# Patient Record
Sex: FEMALE | Race: WHITE
[De-identification: names, ages, dates, MRNs, and addresses within clinical notes are randomized per-mention and may not be internally consistent; named-entity substitution may affect disease eponyms.]

---

## 2018-02-05 ENCOUNTER — HOSPITAL ENCOUNTER (OUTPATIENT)
Dept: HOSPITAL 14 - H.ER | Age: 83
Setting detail: OBSERVATION
LOS: 1 days | Discharge: HOME | End: 2018-02-06
Attending: FAMILY MEDICINE | Admitting: FAMILY MEDICINE
Payer: MEDICARE

## 2018-02-05 VITALS — RESPIRATION RATE: 18 BRPM

## 2018-02-05 DIAGNOSIS — G51.0: ICD-10-CM

## 2018-02-05 DIAGNOSIS — R42: Primary | ICD-10-CM

## 2018-02-05 DIAGNOSIS — Z91.018: ICD-10-CM

## 2018-02-05 LAB
ALBUMIN SERPL-MCNC: 3.5 G/DL (ref 3.5–5)
ALBUMIN/GLOB SERPL: 1.3 {RATIO} (ref 1–2.1)
ALT SERPL-CCNC: 30 U/L (ref 9–52)
AST SERPL-CCNC: 22 U/L (ref 14–36)
BASOPHILS # BLD AUTO: 0.1 K/UL (ref 0–0.2)
BASOPHILS NFR BLD: 1 % (ref 0–2)
BUN SERPL-MCNC: 20 MG/DL (ref 7–17)
CALCIUM SERPL-MCNC: 9.1 MG/DL (ref 8.4–10.2)
EOSINOPHIL # BLD AUTO: 0.2 K/UL (ref 0–0.7)
EOSINOPHIL NFR BLD: 4.2 % (ref 0–4)
ERYTHROCYTE [DISTWIDTH] IN BLOOD BY AUTOMATED COUNT: 13.5 % (ref 11.5–14.5)
GFR NON-AFRICAN AMERICAN: 59
HGB BLD-MCNC: 11.5 G/DL (ref 12–16)
LYMPHOCYTES # BLD AUTO: 0.9 K/UL (ref 1–4.3)
LYMPHOCYTES NFR BLD AUTO: 18 % (ref 20–40)
MAGNESIUM SERPL-MCNC: 2 MG/DL (ref 1.6–2.3)
MCH RBC QN AUTO: 32 PG (ref 27–31)
MCHC RBC AUTO-ENTMCNC: 33.7 G/DL (ref 33–37)
MCV RBC AUTO: 95.1 FL (ref 81–99)
MONOCYTES # BLD: 0.3 K/UL (ref 0–0.8)
MONOCYTES NFR BLD: 6.8 % (ref 0–10)
NEUTROPHILS # BLD: 3.6 K/UL (ref 1.8–7)
NEUTROPHILS NFR BLD AUTO: 70 % (ref 50–75)
NRBC BLD AUTO-RTO: 0 % (ref 0–0)
PLATELET # BLD: 177 K/UL (ref 130–400)
PMV BLD AUTO: 7.6 FL (ref 7.2–11.7)
RBC # BLD AUTO: 3.58 MIL/UL (ref 3.8–5.2)
WBC # BLD AUTO: 5.2 K/UL (ref 4.8–10.8)

## 2018-02-05 PROCEDURE — 71045 X-RAY EXAM CHEST 1 VIEW: CPT

## 2018-02-05 PROCEDURE — 84484 ASSAY OF TROPONIN QUANT: CPT

## 2018-02-05 PROCEDURE — 82948 REAGENT STRIP/BLOOD GLUCOSE: CPT

## 2018-02-05 PROCEDURE — 83735 ASSAY OF MAGNESIUM: CPT

## 2018-02-05 PROCEDURE — 93005 ELECTROCARDIOGRAM TRACING: CPT

## 2018-02-05 PROCEDURE — 70450 CT HEAD/BRAIN W/O DYE: CPT

## 2018-02-05 PROCEDURE — 80053 COMPREHEN METABOLIC PANEL: CPT

## 2018-02-05 PROCEDURE — 99283 EMERGENCY DEPT VISIT LOW MDM: CPT

## 2018-02-05 PROCEDURE — 85025 COMPLETE CBC W/AUTO DIFF WBC: CPT

## 2018-02-05 PROCEDURE — 87040 BLOOD CULTURE FOR BACTERIA: CPT

## 2018-02-05 PROCEDURE — 87086 URINE CULTURE/COLONY COUNT: CPT

## 2018-02-05 NOTE — RAD
HISTORY:

near syncope  



COMPARISON:

07/08/2009. 



FINDINGS:



LUNGS:

No active pulmonary disease.



PLEURA:

No significant pleural effusion identified, no pneumothorax apparent.



CARDIOVASCULAR:

 No radiographic findings to suggest acute or significant 

cardiovascular disease.



OSSEOUS STRUCTURES:

No significant abnormalities.



VISUALIZED UPPER ABDOMEN:

Normal.



OTHER FINDINGS:

None.



IMPRESSION:

No active disease. No significant interval change compared to the 

prior examination(s).

## 2018-02-05 NOTE — CT
PROCEDURE:  CT scan brain dated 2/5/18. 



HISTORY:

Near syncope. 



COMPARISON:

No prior study available for comparison 



TECHNIQUE:

Axial computed tomography images were obtained through the head/brain 

without intravenous contrast.  



Radiation dose:



Total exam DLP = 757.76 mGy-cm.



This CT exam was performed using one or more of the following dose 

reduction techniques: Automated exposure control, adjustment of the 

mA and/or kV according to patient size, and/or use of iterative 

reconstruction technique.



FINDINGS:



HEMORRHAGE:

No acute parenchymal, subarachnoid or extra-axial. 



BRAIN:

Mild to moderate diffuse/confluent chronic white matter ischemic 

changes seen extending peripherally into the deep and subcortical 

white matter both cerebral hemispheres.  Changes are most conspicuous 

in the posterior parieto-occipital watershed zone. Additionally, 

there may also be a few scattered chronic appearing bilateral basal 

nuclei lacunar type infarcts. 



Calcifications seen dentate nuclei both cerebellar hemispheres; rule 

out the metabolic abnormality. 



Moderate generalized volume loss. 



Minor vascular calcifications both carotid siphons. 



VENTRICLES:

No obstructive hydrocephalus. 



CALVARIUM:

Unremarkable.



PARANASAL SINUSES:

Mild to moderate mucosal thickening seen within the ethmoid air 

complex.  Minor mucosal thickening sphenoid sinus.



MASTOID AIR CELLS:

Unremarkable as visualized. No inflammatory changes.



OTHER FINDINGS:

None.



IMPRESSION:

No acute intracranial hemorrhage.



Mild to moderate chronic white matter ischemic changes with suspected 

few chronic appearing bilateral basal nuclei lacunar type infarcts. 



Calcifications both dentate nuclei ; rule out metabolic abnormality. 



Moderate generalized volume loss.



Mucoperiosteal inflammatory changes at aforementioned paranasal 

sinuses.

## 2018-02-05 NOTE — ED PDOC
Syncope/Near Syncope/Dizziness


Additional Complaint(s): 


88 yo female pmhx childhood Bell palsy presents to ED  c/o feeling 

lightheadedness and "woozy" this morning when getting up from sitting position. 

Pt reports she has hx dizziness for almost year now but this morning she felt 

dizzy more than usual. Denies any chest pain, palpitation, dyspnea, blurry 

vision, nausea, vomiting or headche before the dizziness or after dizziness. 

Denies fever, chills or  complaints. 








PMD: Dr. Farrar





<Sultan Maximilian - Last Filed: 18 16:37>





<Reyes Fournier - Last Filed: 18 16:49>


Time Seen by Provider: 18 13:40


Chief Complaint (Nursing): Dizziness/Lightheaded





Past Medical History


Vital Signs: 





 Last Vital Signs











Temp  97.3 F L  18 13:06


 


Pulse  71   18 13:06


 


Resp  16   18 13:06


 


BP  108/54 L  18 13:06


 


Pulse Ox  98   18 13:06














- Medical History


PMH: Bronchitis (Chronic ), Diverticulitis


Other PMH: Childhood Bell palsy





- Surgical History


Other surgeries: Partial colectomy





- Family History


Family History: States: No Known Family Hx





- Social History


Current smoker - smoking cessation education provided: No


Alcohol: None


Drugs: Sherlyn





<Sultan Maximilian - Last Filed: 18 16:37>


Vital Signs: 





 Last Vital Signs











Temp  97.3 F L  18 13:06


 


Pulse  71   18 13:06


 


Resp  16   18 13:06


 


BP  108/54 L  18 13:06


 


Pulse Ox  98   18 14:39














<Reyes Fournier - Last Filed: 18 16:49>





- Allergies


Allergies/Adverse Reactions: 


 Allergies











Allergy/AdvReac Type Severity Reaction Status Date / Time


 


barley Allergy  SWELLING Verified 18 13:12


 


gluten Allergy  SWELLING Verified 18 13:12


 


wheat Allergy  SWELLING Verified 18 13:12














Review of Systems


Constitutional: Negative for: Fever, Chills


Eyes: Negative for: Vision Change


Cardiovascular: Negative for: Chest Pain, Palpitations


Respiratory: Negative for: Cough, Shortness of Breath


Gastrointestinal: Negative for: Nausea, Vomiting, Abdominal Pain


Genitourinary Female: Negative for: Dysuria


Neurological: Negative for: Confusion, Headache





<Sultan Maximilian - Last Filed: 18 16:37>





Physical Exam





- Physical Exam


Appears: Negative for: Non-toxic, No Acute Distress


Head Exam: Positive for: ATRAUMATIC (Facial asymetry, drooping on left sided. 

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


ENT: Positive for: Other (Cerumen on right ear)


Neck: Positive for: Normal


Cardiovascular/Chest: Positive for: Regular Rate, Rhythm.  Negative for: Murmur


Respiratory: Positive for: Normal Breath Sounds.  Negative for: Crackles, 

Wheezing


Gastrointestinal/Abdominal: Positive for: Bowel Sounds, Soft.  Negative for: 

Tenderness


Extremity: Positive for: Normal ROM


Neurologic/Psych: Positive for: Alert, Oriented





<Sultan Maximilian - Last Filed: 18 16:37>





- Laboratory Results


Result Diagrams: 


 18 15:08





 18 15:08





- ECG


O2 Sat by Pulse Oximetry: 98





- Radiology


X-Ray: Read By Radiologist


X-Ray Interpretation: No Acute Disease





- Progress


ED Course And Treament: 


Assessment:


 88 yo female pmhx Bell palsy presents to ED with c/o feeling dizzy and 

lightheadedness.





Plan: 


   EKG


   HEAD CT W/O CONTRAST


   CMP


   CBC


   MG


   TROPONIN I


   BLOOD CX


   URINE CX


   FINGER STICK GLUCOSE


   ORTHOSTATIC  BP


   IV FLUID 500 CC NS BOLUS





Case d/w ED attending Dr. Fournier





Re-evaluation: 4:30 pm





Labs reviewed, CBC only remarkable for Hb 11.5


CT scan shows no acute intracranial hemorrhage


CXR is negative for acute cardiopulmonary disease.


Pt will be admitted for telemetry for continuous monitoring for 24 hours under 

Dr. Farrar's service.








Case d/w ED attending Dr. Fournier








<Sultan Maximilian - Last Filed: 18 16:37>





- Laboratory Results


Result Diagrams: 


 18 15:08





 18 15:08





<Reyes Fournier - Last Filed: 18 16:49>





Medical Decision Making


Medical Decision Makin yo female with feeling of wuzziness described as lightheadedness. No pain. 

Agree with resident history and physical, assessment and plan.


DDx: Near Syncope r/o Neuro vs  Cardiac vs BPV vs Infection





********************************************************************************

***


PROCEDURE:  CT scan brain dated 18. 


IMPRESSION:


No acute intracranial hemorrhage.


Mild to moderate chronic white matter ischemic changes with suspected few 

chronic appearing bilateral basal nuclei lacunar type infarcts. 


Calcifications both dentate nuclei ; rule out metabolic abnormality. 


Moderate generalized volume loss.


Mucoperiosteal inflammatory changes at aforementioned paranasal sinus


********************************************************************************

***





Labs reviewed. CT reviewed. Discussed case with Dr. Farrar who will accept her 

to telemetry for near syncope, dizziness. 





EKG: NSR at 69 bpm with no ST elevations, TWI III





<Reyes Fournier - Last Filed: 18 16:49>





Disposition





<Sultan Maximilian - Last Filed: 18 16:37>





- Patient ED Disposition


Is Patient to be Admitted: Yes





- Disposition


Disposition Time: 16:49





- Pt Status Changed To:


Hospital Disposition Of: Observation





<Reyes Fournier - Last Filed: 18 16:49>





- Clinical Impression


Clinical Impression: 


 Near syncope








- Disposition


Condition: FAIR


Forms:  [employer] (English)

## 2018-02-06 VITALS
HEART RATE: 80 BPM | OXYGEN SATURATION: 94 % | SYSTOLIC BLOOD PRESSURE: 100 MMHG | TEMPERATURE: 97.8 F | DIASTOLIC BLOOD PRESSURE: 63 MMHG

## 2018-02-06 NOTE — CP.PCM.HP
History of Present Illness





- History of Present Illness


History of Present Illness: 





86 YO F w/ PMH of bell palsy was admitted for dizziness. Dizziness has been 

going on for some time but recently has gotten worse. Dizziness is aggrevated 

when getting up all of a sudden. States it sometimes occurs when she has not 

ate properly and is associated with some sweating, but denies palpatations. 

Denies any LOC or head trauma. 


Currently since she has been at the hospital she denies such symptoms. 


Currently denies chest pain, palpatations, SOB, dyspnea, nausea, vomiting 

diarrhea. 





PMH: Diverticulitis, Cilliac disease, Chronic bronchitis


Allergy: Barley, gluten , wheat


PMD: Dr. Farrar














Present on Admission





- Present on Admission


Any Indicators Present on Admission: No





Review of Systems





- Review of Systems


All systems: reviewed and no additional remarkable complaints except





Past Patient History





- Infectious Disease


Hx of Infectious Diseases: None





- Past Medical History & Family History


Past Medical History?: Yes





- Past Social History


Smoking Status: Never Smoked





- CARDIAC


Hx Cardiac Disorders: No





- PULMONARY


Hx Respiratory Disorders: Yes


Hx Bronchitis: Yes (Chronic )





- NEUROLOGICAL


Hx Neurological Disorder: No





- HEENT


Hx HEENT Problems: Yes


Hx Cataracts: Yes





- RENAL


Hx Chronic Kidney Disease: No





- ENDOCRINE/METABOLIC


Hx Endocrine Disorders: No





- HEMATOLOGICAL/ONCOLOGICAL


Hx Blood Disorders: No





- INTEGUMENTARY


Hx Dermatological Problems: No





- MUSCULOSKELETAL/RHEUMATOLOGICAL


Hx Musculoskeletal Disorders: No


Hx Falls: No





- GASTROINTESTINAL


Hx Gastrointestinal Disorders: Yes


Hx Diverticulitis: Yes


Other/Comment: Celiac disease





- GENITOURINARY/GYNECOLOGICAL


Hx Genitourinary Disorders: Yes


Hx Incontinence: Yes





- PSYCHIATRIC


Hx Psychophysiologic Disorder: No


Hx Substance Use: No





- SURGICAL HISTORY


Hx Surgeries: Yes


Other/Comment: left eyelid surgery, partial colectomy





- ANESTHESIA


Hx Anesthesia: Yes


Hx Anesthesia Reactions: No





Meds


Home Medications: 


 Home Medication List











 Medication  Instructions  Recorded  Confirmed  Type


 


Meclizine [Meclizine*] 25 mg PO Q6 #30 tab 02/06/18  Rx











Allergies/Adverse Reactions: 


 Allergies











Allergy/AdvReac Type Severity Reaction Status Date / Time


 


barley Allergy  SWELLING Verified 02/05/18 13:12


 


gluten Allergy  SWELLING Verified 02/05/18 13:12


 


wheat Allergy  SWELLING Verified 02/05/18 13:12














Physical Exam





- Constitutional


Appears: No Acute Distress





- Head Exam


Head Exam: NORMAL INSPECTION


Additional comments: 


facial asymetry, drooping of left side. Unable to close left eye secondary to 

chronic bells palsy





- Eye Exam


Eye Exam: Normal appearance





- Respiratory Exam


Respiratory Exam: Clear to Auscultation Bilateral, NORMAL BREATHING PATTERN.  

absent: Rhonchi, Wheezes





- Cardiovascular Exam


Cardiovascular Exam: REGULAR RHYTHM, +S1, +S2





- GI/Abdominal Exam


GI & Abdominal Exam: Normal Bowel Sounds, Soft.  absent: Tenderness





- Neurological Exam


Neurological exam: Alert, Oriented x3





- Skin


Skin Exam: Normal Color, Warm





Results





- Vital Signs


Recent Vital Signs: 





 Last Vital Signs











Temp  98.9 F   02/06/18 08:50


 


Pulse  75   02/06/18 08:50


 


Resp  18   02/06/18 08:50


 


BP  159/74 H  02/06/18 08:50


 


Pulse Ox  96   02/06/18 08:50














- Labs


Result Diagrams: 


 02/05/18 15:08





 02/05/18 15:08


Labs: 





 Laboratory Results - last 24 hr











  02/05/18 02/05/18 02/05/18





  13:19 15:08 15:08


 


WBC   5.2 


 


RBC   3.58 L 


 


Hgb   11.5 L 


 


Hct   34.0 


 


MCV   95.1 


 


MCH   32.0 H 


 


MCHC   33.7 


 


RDW   13.5 


 


Plt Count   177 


 


MPV   7.6 


 


Neut % (Auto)   70.0 


 


Lymph % (Auto)   18.0 L 


 


Mono % (Auto)   6.8 


 


Eos % (Auto)   4.2 H 


 


Baso % (Auto)   1.0 


 


Neut # (Auto)   3.6 


 


Lymph # (Auto)   0.9 L 


 


Mono # (Auto)   0.3 


 


Eos # (Auto)   0.2 


 


Baso # (Auto)   0.1 


 


Sodium    141


 


Potassium    4.6


 


Chloride    109 H


 


Carbon Dioxide    24


 


Anion Gap    13


 


BUN    20 H


 


Creatinine    0.9


 


Est GFR ( Amer)    > 60


 


Est GFR (Non-Af Amer)    59


 


POC Glucose (mg/dL)  85  


 


Random Glucose    88


 


Calcium    9.1


 


Magnesium    2.0


 


Total Bilirubin    0.4


 


AST    22


 


ALT    30


 


Alkaline Phosphatase    52


 


Troponin I    < 0.0120


 


Total Protein    6.2 L


 


Albumin    3.5


 


Globulin    2.6


 


Albumin/Globulin Ratio    1.3














Assessment & Plan





- Assessment and Plan (Free Text)


Assessment: 


 88 yo female pmhx Bell palsy was admitted with c/o feeling dizzy and 

lightheadedness.





1) Dizziness/ light headedness (resolved) unknown etiology: 


- Patient is being admitted for observation on telemetry


- IV fluids


- Neurocheck Q4


- Head CT negative


- Troponin x1 negative


- Meclizine PRN

## 2018-02-06 NOTE — CARD
--------------- APPROVED REPORT --------------





EKG Measurement

Heart [id number]

DC 154P3

[id number]

QT420T-15

CBs495



<Conclusion>

Normal sinus rhythm

Nonspecific ST abnormality

Abnormal ECG

## 2018-02-06 NOTE — CP.PCM.DIS
Provider





- Provider


Date of Admission: 


02/05/18 16:10





Attending physician: 


Patricio Farrar MD





Time Spent in preparation of Discharge (in minutes): 30





Diagnosis





- Discharge Diagnosis


(1) Dizziness


Status: Acute   





Hospital Course





- Lab Results


Lab Results: 


 Most Recent Lab Values











WBC  5.2 K/uL (4.8-10.8)   02/05/18  15:08    


 


RBC  3.58 Mil/uL (3.80-5.20)  L  02/05/18  15:08    


 


Hgb  11.5 g/dL (12.0-16.0)  L  02/05/18  15:08    


 


Hct  34.0 % (34.0-47.0)   02/05/18  15:08    


 


MCV  95.1 fl (81.0-99.0)   02/05/18  15:08    


 


MCH  32.0 pg (27.0-31.0)  H  02/05/18  15:08    


 


MCHC  33.7 g/dL (33.0-37.0)   02/05/18  15:08    


 


RDW  13.5 % (11.5-14.5)   02/05/18  15:08    


 


Plt Count  177 K/uL (130-400)   02/05/18  15:08    


 


MPV  7.6 fl (7.2-11.7)   02/05/18  15:08    


 


Neut % (Auto)  70.0 % (50.0-75.0)   02/05/18  15:08    


 


Lymph % (Auto)  18.0 % (20.0-40.0)  L  02/05/18  15:08    


 


Mono % (Auto)  6.8 % (0.0-10.0)   02/05/18  15:08    


 


Eos % (Auto)  4.2 % (0.0-4.0)  H  02/05/18  15:08    


 


Baso % (Auto)  1.0 % (0.0-2.0)   02/05/18  15:08    


 


Neut # (Auto)  3.6 K/uL (1.8-7.0)   02/05/18  15:08    


 


Lymph # (Auto)  0.9 K/uL (1.0-4.3)  L  02/05/18  15:08    


 


Mono # (Auto)  0.3 K/uL (0.0-0.8)   02/05/18  15:08    


 


Eos # (Auto)  0.2 K/uL (0.0-0.7)   02/05/18  15:08    


 


Baso # (Auto)  0.1 K/uL (0.0-0.2)   02/05/18  15:08    


 


Sodium  141 mmol/l (132-148)   02/05/18  15:08    


 


Potassium  4.6 MMOL/L (3.6-5.0)   02/05/18  15:08    


 


Chloride  109 mmol/L ()  H  02/05/18  15:08    


 


Carbon Dioxide  24 mmol/L (22-30)   02/05/18  15:08    


 


Anion Gap  13  (10-20)   02/05/18  15:08    


 


BUN  20 mg/dl (7-17)  H  02/05/18  15:08    


 


Creatinine  0.9 mg/dl (0.7-1.2)   02/05/18  15:08    


 


Est GFR ( Amer)  > 60   02/05/18  15:08    


 


Est GFR (Non-Af Amer)  59   02/05/18  15:08    


 


POC Glucose (mg/dL)  85 mg/dL ()   02/05/18  13:19    


 


Random Glucose  88 mg/dL ()   02/05/18  15:08    


 


Calcium  9.1 mg/dL (8.4-10.2)   02/05/18  15:08    


 


Magnesium  2.0 MG/DL (1.6-2.3)   02/05/18  15:08    


 


Total Bilirubin  0.4 mg/dl (0.2-1.3)   02/05/18  15:08    


 


AST  22 U/L (14-36)   02/05/18  15:08    


 


ALT  30 U/L (9-52)   02/05/18  15:08    


 


Alkaline Phosphatase  52 U/L ()   02/05/18  15:08    


 


Troponin I  < 0.0120 ng/mL (0.00-0.120)   02/05/18  15:08    


 


Total Protein  6.2 G/DL (6.3-8.2)  L  02/05/18  15:08    


 


Albumin  3.5 g/dL (3.5-5.0)   02/05/18  15:08    


 


Globulin  2.6 gm/dL (2.2-3.9)   02/05/18  15:08    


 


Albumin/Globulin Ratio  1.3  (1.0-2.1)   02/05/18  15:08    














- Hospital Course


Hospital Course: 





88 YO F was admitted to [facility] under observation for episodic dizziness. Patient 

was thoroughly worked up. Head CT did not show any acute events. Symptoms have 

resolved and patient is feeling  better. Patient has been advised to increase 

hydration and not skip meals. Also is being given meclizine  to take for the 

dizziness. 





Discharge Exam





- Head Exam


Head Exam: NORMAL INSPECTION


Additional comments: 





facial asymmetry, drooping of left side, unable to close left eye lid. 





- Eye Exam


Eye Exam: Normal appearance





- Respiratory Exam


Respiratory Exam: Clear to PA & Lateral.  absent: Wheezes, Respiratory Distress





- Cardiovascular Exam


Cardiovascular Exam: REGULAR RHYTHM, +S1, +S2





- GI/Abdominal Exam


GI & Abdominal Exam: Normal Bowel Sounds, Soft.  absent: Tenderness





- Neurological Exam


Neurological exam: Alert, Oriented x3





- Skin


Skin Exam: Normal Color, Warm





Discharge Plan





- Discharge Medications


Prescriptions: 


Meclizine [Meclizine*] 25 mg PO Q6 #30 tab





- Follow Up Plan


Condition: GOOD


Disposition: HOME/ ROUTINE


Instructions:  Syncope (DC)


Additional Instructions: 


pt. cleared for discharge to Home today by 


Rx for meds given


- Increase hydration and do not skip meals


pt. will f/u with pmd  in 1 week


Referrals: 


Patricio Farrar MD [Family Provider] -